# Patient Record
Sex: FEMALE | Race: WHITE | Employment: OTHER | URBAN - METROPOLITAN AREA
[De-identification: names, ages, dates, MRNs, and addresses within clinical notes are randomized per-mention and may not be internally consistent; named-entity substitution may affect disease eponyms.]

---

## 2019-04-18 ENCOUNTER — THERAPY VISIT (OUTPATIENT)
Dept: PHYSICAL THERAPY | Facility: CLINIC | Age: 69
End: 2019-04-18
Payer: COMMERCIAL

## 2019-04-18 DIAGNOSIS — M54.42 BILATERAL LOW BACK PAIN WITH LEFT-SIDED SCIATICA: ICD-10-CM

## 2019-04-18 PROCEDURE — 97162 PT EVAL MOD COMPLEX 30 MIN: CPT | Mod: GP | Performed by: PHYSICAL THERAPIST

## 2019-04-18 PROCEDURE — 97110 THERAPEUTIC EXERCISES: CPT | Mod: GP | Performed by: PHYSICAL THERAPIST

## 2019-04-18 NOTE — LETTER
[facility]  [address] [address] [place] [id number]  [phone]    2019    Re: Marta Hernandez   :   1950  MRN:  [id number]   REFERRING PHYSICIAN:   Anahi Martinez    [facility]    Date of Initial Evaluation:  2019  Visits:  Rxs Used: 1  Reason for Referral:  Bilateral low back pain with left-sided sciatica    EVALUATION SUMMARY    [facility] Initial Evaluation    Subjective:  [facility] Initial Evaluation  Ms. Hernandez is an active retired 69 year old woman with sudden onset of left flank and low back pain 2 weeks ago, 2019. She woke up with pain, Insidious onset. She tried to self manage her symptoms, but the pain increased in severity. She has been on a steroid dose pack for the past three days, which has helped her improve. There is pain with palpation of the left flank, not the lumbar spine today. Left hip symptoms appear more GT bursitis versus radicular in nature. Trunk AROM is only limited in extension, Marta can easily touch her toes. Myotomes intact. Negative SLR and Slump. We have started on a low back exercise program. If symptoms persist, Marta will return to see her PA. Pain is rated as 4/10 today.  The history is provided by the patient. No  was used.   Marta Hernandez is a 69 year old female with a lumbar (left hip) condition.  Condition occurred with:  Insidious onset.  Condition occurred: at home.  This is a new condition  2019.    Site of Pain: left flank.  Radiates to: left glut med.  Pain is described as sharp and aching and is intermittent and reported as 4/10.   Pain is worse during the day.  Symptoms are exacerbated by standing, certain positions and walking and relieved by ice and rest (prednisone).  Since onset symptoms are gradually improving.        General health as reported by patient is excellent.  Pertinent medical history includes:   None.              Barriers include:  None as reported by the patient.  Red flags:  None as reported by the patient.  General health as reported by patient is excellent.    Medical allergies: yes (Penicillin).      Current occupation is Retired.      Oswestry Score: 32 %                             Objective:  Standing Alignment:    Lumbar:  Lordosis decr  Gait:    Gait Type:  Antalgic     Deviations:  General Deviations:  Cintia decr and stride length decr    Re: Marta Hernandez   :   1950                 Lumbar/SI Evaluation  ROM:    AROM Lumbar:   Flexion:          Can touch toes  Ext:                    10%   Side Bend:        Left:     Right:   Rotation:           Left:  60%    Right:  60%  Side Glide:        Left:     Right:   Lumbar Myotomes:  normal  Lumbar DTR's:  not assessed  Cord Signs:  not assessed  Lumbar Dermtomes:  not assessed  Neural Tension/Mobility:    Left side:SLR; SLR w/DF or Slump  negative.   Lumbar Palpation:  Palpation (lumbar): left GT bursa.  Tenderness present at Left:    Iliac Crest                                    Musculoskeletal:        Lumbar back: She exhibits tenderness.        Back:        Assessment/Plan:    Patient is a 69 year old female with lumbar complaints.    Patient has the following significant findings with corresponding treatment plan.                Diagnosis 1:  LBP with left radiculopathy  Pain -  self management, education and home program  Decreased ROM/flexibility - manual therapy, therapeutic exercise and home program  Impaired gait - home program  Impaired muscle performance - neuro re-education and home program  Decreased function - therapeutic activities and home program  Therapy Evaluation Codes:   1) History comprised of:   Personal factors that impact the plan of care:      None.   Re: Marta Hernandez   :   1950     Comorbidity factors that impact the plan of care are:      None.     Medications impacting care: Steroids.  2) Examination of  Body Systems comprised of:   Body structures and functions that impact the plan of care:      Lumbar spine.   Activity limitations that impact the plan of care are:      Lifting, Walking and Sleeping.  3) Clinical presentation characteristics are:   Evolving/Changing.  4) Decision-Making    Moderate complexity using standardized patient assessment instrument and/or measureable assessment of functional outcome.  Cumulative Therapy Evaluation is: Moderate complexity.    Previous and current functional limitations:  (See Goal Flow Sheet for this information)    Short term and Long term goals: (See Goal Flow Sheet for this information)   Communication ability:  Patient appears to be able to clearly communicate and understand verbal and written communication and follow directions correctly.  Treatment Explanation - The following has been discussed with the patient:   RX ordered/plan of care  Anticipated outcomes  Possible risks and side effects  This patient would benefit from PT intervention to resume normal activities.   Rehab potential is good.  Frequency:  2 X week, once daily  Duration:  for 6 weeks  Discharge Plan:  Achieve all LTG.  Independent in home treatment program.  Reach maximal therapeutic benefit.          Thank you for your referral.    INQUIRIES  Therapist: Magalie James DPT  [address] [address]  Phone: [phone]  Fax: [phone]

## 2019-04-18 NOTE — PROGRESS NOTES
[facility] Initial Evaluation  Subjective:  [facility] Initial Evaluation    Ms. Hernandez is an active retired 69 year old woman with sudden onset of left flank and low back pain 2 weeks ago, April 4, 2019. She woke up with pain, Insidious onset. She tried to self manage her symptoms, but the pain increased in severity. She has been on a steroid dose pack for the past three days, which has helped her improve. There is pain with palpation of the left flank, not the lumbar spine today. Left hip symptoms appear more GT bursitis versus radicular in nature. Trunk AROM is only limited in extension, Marta can easily touch her toes. Myotomes intact. Negative SLR and Slump. We have started on a low back exercise program. If symptoms persist, Marta will return to see her PA. Pain is rated as 4/10 today.    The history is provided by the patient. No  was used.   Marta Hernandez is a 69 year old female with a lumbar (left hip) condition.  Condition occurred with:  Insidious onset.  Condition occurred: at home.  This is a new condition  April 4, 2019.    Site of Pain: left flank.  Radiates to: left glut med.  Pain is described as sharp and aching and is intermittent and reported as 4/10.   Pain is worse during the day.  Symptoms are exacerbated by standing, certain positions and walking and relieved by ice and rest (prednisone).  Since onset symptoms are gradually improving.        General health as reported by patient is excellent.  Pertinent medical history includes:  None.                Barriers include:  None as reported by the patient.    Red flags:  None as reported by the patient.                        Objective:  Standing Alignment:        Lumbar:  Lordosis decr            Gait:    Gait Type:  Antalgic     Deviations:  General Deviations:  Cintia decr and stride length decr               Lumbar/SI Evaluation  ROM:    AROM Lumbar:   Flexion:          Can touch  toes  Ext:                    10%   Side Bend:        Left:     Right:   Rotation:           Left:  60%    Right:  60%  Side Glide:        Left:     Right:           Lumbar Myotomes:  normal            Lumbar DTR's:  not assessed      Cord Signs:  not assessed    Lumbar Dermtomes:  not assessed                Neural Tension/Mobility:      Left side:SLR; SLR w/DF or Slump  negative.     Lumbar Palpation:  Palpation (lumbar): left GT bursa.  Tenderness present at Left:    Iliac Crest                                                                                            Musculoskeletal:        Lumbar back: She exhibits tenderness.        Back:        ROS    Assessment/Plan:    Patient is a 69 year old female with lumbar complaints.    Patient has the following significant findings with corresponding treatment plan.                Diagnosis 1:  LBP with left radiculopathy  Pain -  self management, education and home program  Decreased ROM/flexibility - manual therapy, therapeutic exercise and home program  Impaired gait - home program  Impaired muscle performance - neuro re-education and home program  Decreased function - therapeutic activities and home program    Therapy Evaluation Codes:   1) History comprised of:   Personal factors that impact the plan of care:      None.    Comorbidity factors that impact the plan of care are:      None.     Medications impacting care: Steroids.  2) Examination of Body Systems comprised of:   Body structures and functions that impact the plan of care:      Lumbar spine.   Activity limitations that impact the plan of care are:      Lifting, Walking and Sleeping.  3) Clinical presentation characteristics are:   Evolving/Changing.  4) Decision-Making    Moderate complexity using standardized patient assessment instrument and/or measureable assessment of functional outcome.  Cumulative Therapy Evaluation is: Moderate complexity.    Previous and current functional limitations:  (See  Goal Flow Sheet for this information)    Short term and Long term goals: (See Goal Flow Sheet for this information)     Communication ability:  Patient appears to be able to clearly communicate and understand verbal and written communication and follow directions correctly.  Treatment Explanation - The following has been discussed with the patient:   RX ordered/plan of care  Anticipated outcomes  Possible risks and side effects  This patient would benefit from PT intervention to resume normal activities.   Rehab potential is good.    Frequency:  2 X week, once daily  Duration:  for 6 weeks  Discharge Plan:  Achieve all LTG.  Independent in home treatment program.  Reach maximal therapeutic benefit.    Please refer to the daily flowsheet for treatment today, total treatment time and time spent performing 1:1 timed codes.

## 2019-04-18 NOTE — PROGRESS NOTES
[facility] Initial Evaluation  Subjective:                                     General health as reported by patient is excellent.    Medical allergies: yes (Penicillin).      Current occupation is Retired.                  Oswestry Score: 32 %                 Objective:  System    Physical Exam    General     ROS    Assessment/Plan:

## 2019-04-22 ENCOUNTER — THERAPY VISIT (OUTPATIENT)
Dept: PHYSICAL THERAPY | Facility: CLINIC | Age: 69
End: 2019-04-22
Payer: COMMERCIAL

## 2019-04-22 DIAGNOSIS — M54.42 BILATERAL LOW BACK PAIN WITH LEFT-SIDED SCIATICA: ICD-10-CM

## 2019-04-22 PROCEDURE — 97110 THERAPEUTIC EXERCISES: CPT | Mod: GP | Performed by: PHYSICAL THERAPIST

## 2019-04-22 PROCEDURE — 97035 APP MDLTY 1+ULTRASOUND EA 15: CPT | Mod: GP | Performed by: PHYSICAL THERAPIST

## 2019-04-22 PROCEDURE — 97140 MANUAL THERAPY 1/> REGIONS: CPT | Mod: GP | Performed by: PHYSICAL THERAPIST

## 2019-04-25 ENCOUNTER — THERAPY VISIT (OUTPATIENT)
Dept: PHYSICAL THERAPY | Facility: CLINIC | Age: 69
End: 2019-04-25
Payer: COMMERCIAL

## 2019-04-25 DIAGNOSIS — M54.42 BILATERAL LOW BACK PAIN WITH LEFT-SIDED SCIATICA: ICD-10-CM

## 2019-04-25 PROCEDURE — 97140 MANUAL THERAPY 1/> REGIONS: CPT | Mod: GP | Performed by: PHYSICAL THERAPIST

## 2019-04-25 PROCEDURE — 97035 APP MDLTY 1+ULTRASOUND EA 15: CPT | Mod: GP | Performed by: PHYSICAL THERAPIST

## 2019-04-25 NOTE — PROGRESS NOTES
Subjective:  HPI  Oswestry Score: 30 %                 Objective:  System    Physical Exam    General     ROS    Assessment/Plan:    PROGRESS  REPORT    Progress reporting period is from 4/18 to 4/25/19.       SUBJECTIVE  Subjective changes noted by patient:  Marta has gotten somewhat better, but she still cannot walk 1 block without left flank pain - and standing lumbar extension is extremely limited and painful. Her HEP does not cause an increase in symptoms. No pain with sitting. .       Current Pain level: (with sitting 1/10, 6/10 pain with walking).     Previous pain level was  4/10  .   Changes in function:  None  Adverse reaction to treatment or activity: None    OBJECTIVE  Changes noted in objective findings:  Decreased pain with palpation of the left GT bursa, but continued pain with palpation of the left flank. Marta can easily touch her toes, but has pain and minimal extension of the lumbar spine in standing.        ASSESSMENT/PLAN  Updated problem list and treatment plan: Diagnosis 1:  LBP  Pain -  US, manual therapy, self management, education and home program  Decreased ROM/flexibility - manual therapy, therapeutic exercise and home program  Decreased function - home program  STG/LTGs have been met or progress has been made towards goals:  Marta can now lie on her stomach to sleep, but she cannot walk > 100 feet of stand > 10 minutes without left flank and buttock pain.  Assessment of Progress: The patient's progress has plateaued.  Self Management Plans:  Patient has been instructed in a home treatment program.  Patient  has been instructed in self management of symptoms.      Recommendations:  Marta is not a complainer, but I have asked her to return to her PA - Anahi Martinez - for further assessment. We will hold her chart open.    Please refer to the daily flowsheet for treatment today, total treatment time and time spent performing 1:1 timed codes.

## 2019-06-05 PROBLEM — M54.42 BILATERAL LOW BACK PAIN WITH LEFT-SIDED SCIATICA: Status: RESOLVED | Noted: 2019-04-18 | Resolved: 2019-04-25
